# Patient Record
Sex: FEMALE | Race: WHITE | NOT HISPANIC OR LATINO | URBAN - NONMETROPOLITAN AREA
[De-identification: names, ages, dates, MRNs, and addresses within clinical notes are randomized per-mention and may not be internally consistent; named-entity substitution may affect disease eponyms.]

---

## 2019-08-05 ENCOUNTER — IMPORTED ENCOUNTER (OUTPATIENT)
Dept: URBAN - NONMETROPOLITAN AREA CLINIC 1 | Facility: CLINIC | Age: 29
End: 2019-08-05

## 2019-08-05 PROCEDURE — 92014 COMPRE OPH EXAM EST PT 1/>: CPT

## 2019-08-05 PROCEDURE — 92310 CONTACT LENS FITTING OU: CPT

## 2019-08-05 PROCEDURE — V2520 CONTACT LENS HYDROPHILIC: HCPCS

## 2019-08-05 PROCEDURE — 92015 DETERMINE REFRACTIVE STATE: CPT

## 2019-08-05 NOTE — PATIENT DISCUSSION
Myopia OUDiscussed refractive status with patientNew glasses and contact lens Rx given todayDiscussed proper care replacement and hygieneRecommend Clear Care solution daily at her last visit but she states that she didn't end up needing it as her CL are currently doing great.  Will continue to monitor yearly or PRN RTC 1 year complete; 's Notes: MR 7/16/18

## 2020-08-10 ENCOUNTER — IMPORTED ENCOUNTER (OUTPATIENT)
Dept: URBAN - NONMETROPOLITAN AREA CLINIC 1 | Facility: CLINIC | Age: 30
End: 2020-08-10

## 2020-08-10 PROCEDURE — 92015 DETERMINE REFRACTIVE STATE: CPT

## 2020-08-10 PROCEDURE — 92310 CONTACT LENS FITTING OU: CPT

## 2020-08-10 PROCEDURE — 92014 COMPRE OPH EXAM EST PT 1/>: CPT

## 2020-08-10 NOTE — PATIENT DISCUSSION
Myopia OUDiscussed refractive status with patient. New glasses and contact lens Rx given today. Discussed proper care replacement and hygiene. Continue to monitor.

## 2021-08-16 ENCOUNTER — IMPORTED ENCOUNTER (OUTPATIENT)
Dept: URBAN - NONMETROPOLITAN AREA CLINIC 1 | Facility: CLINIC | Age: 31
End: 2021-08-16

## 2021-08-16 PROCEDURE — 92015 DETERMINE REFRACTIVE STATE: CPT

## 2021-08-16 PROCEDURE — 92310 CONTACT LENS FITTING OU: CPT

## 2021-08-16 PROCEDURE — 92014 COMPRE OPH EXAM EST PT 1/>: CPT

## 2021-08-16 NOTE — PATIENT DISCUSSION
Myopia OUDiscussed refractive status with patient. New glasses and contact lens Rx given today. Discussed proper care replacement and hygiene. Trails of daily lenses given today to help with hygiene and replacement. Continue to monitor yearly or PRN.

## 2022-04-09 ASSESSMENT — VISUAL ACUITY
OD_SC: 20/20-2
OD_SC: 20/20
OD_SC: 20/25 BLURRY
OS_SC: 20/25 BLURRY
OS_SC: 20/20-
OS_SC: 20/20

## 2022-04-09 ASSESSMENT — TONOMETRY
OD_IOP_MMHG: 14
OS_IOP_MMHG: 15
OD_IOP_MMHG: 14
OS_IOP_MMHG: 14
OD_IOP_MMHG: 15
OS_IOP_MMHG: 14

## 2023-07-14 ENCOUNTER — ESTABLISHED PATIENT (OUTPATIENT)
Dept: URBAN - NONMETROPOLITAN AREA CLINIC 1 | Facility: CLINIC | Age: 33
End: 2023-07-14

## 2023-07-14 DIAGNOSIS — H52.13: ICD-10-CM

## 2023-07-14 PROCEDURE — 92015 DETERMINE REFRACTIVE STATE: CPT

## 2023-07-14 PROCEDURE — 92014 COMPRE OPH EXAM EST PT 1/>: CPT

## 2023-07-14 PROCEDURE — 92310-E CONTACT LENS FITTING ESTABLISH PATIENT

## 2023-07-14 ASSESSMENT — VISUAL ACUITY
OU_CC: 20/20-1
OS_CC: 20/20-1
OD_CC: 20/20-1

## 2023-07-14 ASSESSMENT — TONOMETRY
OD_IOP_MMHG: 14
OS_IOP_MMHG: 14